# Patient Record
Sex: FEMALE | Race: WHITE
[De-identification: names, ages, dates, MRNs, and addresses within clinical notes are randomized per-mention and may not be internally consistent; named-entity substitution may affect disease eponyms.]

---

## 2018-12-26 ENCOUNTER — HOSPITAL ENCOUNTER (EMERGENCY)
Dept: HOSPITAL 25 - UCCORT | Age: 78
Discharge: HOME | End: 2018-12-26
Payer: MEDICARE

## 2018-12-26 VITALS — DIASTOLIC BLOOD PRESSURE: 78 MMHG | SYSTOLIC BLOOD PRESSURE: 127 MMHG

## 2018-12-26 DIAGNOSIS — J01.90: Primary | ICD-10-CM

## 2018-12-26 PROCEDURE — G0463 HOSPITAL OUTPT CLINIC VISIT: HCPCS

## 2018-12-26 PROCEDURE — 99212 OFFICE O/P EST SF 10 MIN: CPT

## 2018-12-26 NOTE — UC
Respiratory Complaint HPI





- HPI Summary


HPI Summary: 





The patient is a 78-year-old female presents here with a history and nasal 

congestion postnasal drip and cough 1 week.  She has not been feverish.  She 

has low energy.  She denies any chest pain or shortness of breath.





- History of Current Complaint


Chief Complaint: UCGeneralIllness


Stated Complaint: FLU SX'S


Time Seen by Provider: 12/26/18 14:07


Hx Obtained From: Patient


Onset/Duration: Sudden Onset, Lasting Days


Timing: Constant


Severity Initially: Mild


Severity Currently: Moderate


Pain Intensity: 3


Pain Scale Used: 0-10 Numeric


Character: Cough: Productive


Aggravating Factors: Recumbent Position


Associated Signs And Symptoms: Positive: URI, Nasal Congestion, Sinus 

Discomfort.  Negative: Dyspnea, Fever, Chills, Pleuritic Chest Pain, Wheezing, 

Hemoptysis, Dizziness, Calf Pain, Calf Swelling, Edema





- Allergies/Home Medications


Allergies/Adverse Reactions: 


 Allergies











Allergy/AdvReac Type Severity Reaction Status Date / Time


 


seaonal allergy Allergy  Coughing Uncoded 11/15/16 11:04











Home Medications: 


 Home Medications





Chlorphen/Phenyleph/Dm/Aspirin [Yelitza-Prospect Plus C-C Tab Eff] 1 each PO DAILY 

12/26/18 [History Confirmed 12/26/18]


guaiFENesin [Mucinex] 600 mg PO DAILY 12/26/18 [History Confirmed 12/26/18]











PMH/Surg Hx/FS Hx/Imm Hx


Previously Healthy: Yes


Other History Of: 


   Negative For: HIV, Hepatitis B, Hepatitis C, Anticoagulant Therapy





- Surgical History


Surgical History: Yes


Surgery Procedure, Year, and Place: b/l inguinal hernias.  TONSILLECTOMY





- Family History


Known Family History: Positive: Hypertension


   Negative: Cardiac Disease


Family History: no known cardiovascular issues in family





- Social History


Alcohol Use: Occasionally


Substance Use Type: None


Smoking Status (MU): Never Smoked Tobacco





- Immunization History


Most Recent Influenza Vaccination: OCT 2016





Review of Systems


All Other Systems Reviewed And Are Negative: Yes


Constitutional: Positive: Negative


Skin: Positive: Negative


Eyes: Positive: Negative


ENT: Positive: Nasal Discharge, Sinus Congestion, Sinus Pain/Tenderness


Respiratory: Positive: Cough


Cardiovascular: Positive: Negative


Gastrointestinal: Positive: Negative


Genitourinary: Positive: Negative


Motor: Positive: Negative


Neurovascular: Positive: Negative


Musculoskeletal: Positive: Negative


Neurological: Positive: Negative


Psychological: Positive: Negative





Physical Exam


Triage Information Reviewed: Yes


Appearance: Well-Appearing, No Pain Distress, Well-Nourished


Vital Signs: 


 Initial Vital Signs











Temp  97.5 F   12/26/18 14:01


 


Pulse  65   12/26/18 14:01


 


Resp  15   12/26/18 14:01


 


BP  127/78   12/26/18 14:01


 


Pulse Ox  97   12/26/18 14:01











Vital Signs Reviewed: Yes


Eyes: Positive: Conjunctiva Clear


ENT: Positive: Hearing grossly normal, Nasal congestion, Nasal drainage, Sinus 

tenderness, Uvula midline.  Negative: Tonsillar swelling, Tonsillar exudate, 

Trismus, Muffled voice, Dental tenderness


Neck: Positive: Supple, Nontender, No Lymphadenopathy


Respiratory: Positive: Lungs clear, Normal breath sounds, No respiratory 

distress, No accessory muscle use


Cardiovascular: Positive: RRR, No Murmur


Musculoskeletal: Positive: ROM Intact, No Edema


Neurological: Positive: Alert, Muscle Tone Normal


Psychological Exam: Normal


Skin Exam: Normal





[facility] Diagnostic Evaluation





- Laboratory


O2 Sat by Pulse Oximetry: 97 - normal/not hypoxic





Respiratory Course/Dx





- Differential Dx/Diagnosis


Provider Diagnosis: 


 Acute sinusitis








Discharge





- Sign-Out/Discharge


Documenting (check all that apply): Patient Departure


All imaging exams completed and their final reports reviewed: No Studies





- Discharge Plan


Condition: Stable


Disposition: HOME


Prescriptions: 


Amoxicillin PO (*) [Amoxicillin 500 MG CAP*] 500 mg PO TID #21 cap


Patient Education Materials:  Sinusitis (ED)


Referrals: 


Renetta Castellanos MD [Primary Care Provider] - 1 Week (if not better)





- Billing Disposition and Condition


Condition: STABLE


Disposition: Home